# Patient Record
Sex: FEMALE | Race: OTHER | Employment: FULL TIME | URBAN - METROPOLITAN AREA
[De-identification: names, ages, dates, MRNs, and addresses within clinical notes are randomized per-mention and may not be internally consistent; named-entity substitution may affect disease eponyms.]

---

## 2017-05-30 PROCEDURE — 84144 ASSAY OF PROGESTERONE: CPT | Performed by: OBSTETRICS & GYNECOLOGY

## 2017-09-18 PROCEDURE — 84144 ASSAY OF PROGESTERONE: CPT | Performed by: OBSTETRICS & GYNECOLOGY

## 2017-12-23 PROCEDURE — 85306 CLOT INHIBIT PROT S FREE: CPT | Performed by: OBSTETRICS & GYNECOLOGY

## 2017-12-23 PROCEDURE — 85305 CLOT INHIBIT PROT S TOTAL: CPT | Performed by: OBSTETRICS & GYNECOLOGY

## 2017-12-23 PROCEDURE — 84144 ASSAY OF PROGESTERONE: CPT | Performed by: OBSTETRICS & GYNECOLOGY

## 2017-12-23 PROCEDURE — 86376 MICROSOMAL ANTIBODY EACH: CPT | Performed by: OBSTETRICS & GYNECOLOGY

## 2017-12-30 PROCEDURE — 86147 CARDIOLIPIN ANTIBODY EA IG: CPT | Performed by: OBSTETRICS & GYNECOLOGY

## 2017-12-30 PROCEDURE — 85613 RUSSELL VIPER VENOM DILUTED: CPT | Performed by: OBSTETRICS & GYNECOLOGY

## 2017-12-30 PROCEDURE — 85732 THROMBOPLASTIN TIME PARTIAL: CPT | Performed by: OBSTETRICS & GYNECOLOGY

## 2017-12-30 PROCEDURE — 88237 TISSUE CULTURE BONE MARROW: CPT | Performed by: OBSTETRICS & GYNECOLOGY

## 2017-12-30 PROCEDURE — 85307 ASSAY ACTIVATED PROTEIN C: CPT | Performed by: OBSTETRICS & GYNECOLOGY

## 2017-12-30 PROCEDURE — 85705 THROMBOPLASTIN INHIBITION: CPT | Performed by: OBSTETRICS & GYNECOLOGY

## 2017-12-30 PROCEDURE — 85610 PROTHROMBIN TIME: CPT | Performed by: OBSTETRICS & GYNECOLOGY

## 2017-12-30 PROCEDURE — 81240 F2 GENE: CPT | Performed by: OBSTETRICS & GYNECOLOGY

## 2017-12-30 PROCEDURE — 88262 CHROMOSOME ANALYSIS 15-20: CPT | Performed by: OBSTETRICS & GYNECOLOGY

## 2017-12-30 PROCEDURE — 84144 ASSAY OF PROGESTERONE: CPT | Performed by: OBSTETRICS & GYNECOLOGY

## 2017-12-30 PROCEDURE — 85300 ANTITHROMBIN III ACTIVITY: CPT | Performed by: OBSTETRICS & GYNECOLOGY

## 2017-12-30 PROCEDURE — 83090 ASSAY OF HOMOCYSTEINE: CPT | Performed by: OBSTETRICS & GYNECOLOGY

## 2018-04-12 ENCOUNTER — APPOINTMENT (OUTPATIENT)
Dept: LAB | Facility: HOSPITAL | Age: 33
End: 2018-04-12
Attending: INTERNAL MEDICINE
Payer: COMMERCIAL

## 2018-04-12 ENCOUNTER — OFFICE VISIT (OUTPATIENT)
Dept: SURGERY | Facility: CLINIC | Age: 33
End: 2018-04-12

## 2018-04-12 VITALS
RESPIRATION RATE: 16 BRPM | WEIGHT: 214.06 LBS | SYSTOLIC BLOOD PRESSURE: 132 MMHG | BODY MASS INDEX: 42.03 KG/M2 | HEIGHT: 60 IN | DIASTOLIC BLOOD PRESSURE: 96 MMHG | OXYGEN SATURATION: 98 % | HEART RATE: 91 BPM

## 2018-04-12 DIAGNOSIS — R73.03 PRE-DIABETES: ICD-10-CM

## 2018-04-12 DIAGNOSIS — E66.01 MORBID OBESITY WITH BMI OF 40.0-44.9, ADULT (HCC): ICD-10-CM

## 2018-04-12 DIAGNOSIS — E78.00 HYPERCHOLESTEREMIA: ICD-10-CM

## 2018-04-12 DIAGNOSIS — R73.03 PRE-DIABETES: Primary | ICD-10-CM

## 2018-04-12 PROCEDURE — 93010 ELECTROCARDIOGRAM REPORT: CPT | Performed by: INTERNAL MEDICINE

## 2018-04-12 PROCEDURE — 99204 OFFICE O/P NEW MOD 45 MIN: CPT | Performed by: INTERNAL MEDICINE

## 2018-04-12 PROCEDURE — 93005 ELECTROCARDIOGRAM TRACING: CPT

## 2018-04-12 NOTE — PROGRESS NOTES
[facility]ation Note       Date of Consult:  2018    Patient:  Akiko Armendariz  :      1985  MRN:      [id number]    Referring Provider: Dr. Rubén Leiva       Chief Complaint:  Patient presents with:  Consult  Weight (2/3/2016)  10/24/2017: OTHER      Comment: suction d&c for incomplete  with                hemorrhage w/ general anesthesia @ [facility] w/ TIN.     Family History:    Family History   Problem Relation Age of Onset   • Heart Surgery Father      Bypass   • extremities normal, atraumatic, no cyanosis or edema  Pulses: 2+ and symmetric  Skin: Skin color, texture, turgor normal. No rashes or lesions    ASSESSMENT         Encounter Diagnosis(ses):   Pre-diabetes  (primary encounter diagnosis)  Hypercholesteremia

## 2018-05-08 DIAGNOSIS — E66.01 MORBID OBESITY WITH BMI OF 40.0-44.9, ADULT (HCC): ICD-10-CM

## 2018-05-08 DIAGNOSIS — R73.03 PRE-DIABETES: ICD-10-CM

## 2018-05-08 DIAGNOSIS — E78.00 HYPERCHOLESTEREMIA: ICD-10-CM

## 2018-05-08 RX ORDER — LISDEXAMFETAMINE DIMESYLATE CAPSULES 20 MG/1
20 CAPSULE ORAL DAILY
Qty: 30 CAPSULE | Refills: 0 | Status: CANCELLED
Start: 2018-05-08 | End: 2018-06-07

## 2018-05-16 ENCOUNTER — TELEPHONE (OUTPATIENT)
Dept: SURGERY | Facility: CLINIC | Age: 33
End: 2018-05-16

## 2018-05-16 NOTE — TELEPHONE ENCOUNTER
Regarding: RE:medication  ----- Message from Yessica Cheatham sent at 5/16/2018 10:14 AM CDT -----       ----- Message from Lexi Gaitan to Abelino Larry RN sent at 5/9/2018  9:01 AM -----   Ryan said that this prescription can never be refilled d

## 2018-06-22 ENCOUNTER — TELEPHONE (OUTPATIENT)
Dept: SURGERY | Facility: CLINIC | Age: 33
End: 2018-06-22

## 2018-06-22 NOTE — TELEPHONE ENCOUNTER
Patient never picked up last prescription and prescription .       Patient would like a new prescription for Vyvanse

## 2019-02-18 PROCEDURE — 83525 ASSAY OF INSULIN: CPT | Performed by: FAMILY MEDICINE

## 2019-02-18 PROCEDURE — 86376 MICROSOMAL ANTIBODY EACH: CPT | Performed by: FAMILY MEDICINE

## 2019-02-18 PROCEDURE — 86800 THYROGLOBULIN ANTIBODY: CPT | Performed by: FAMILY MEDICINE

## 2019-04-22 PROCEDURE — 88305 TISSUE EXAM BY PATHOLOGIST: CPT | Performed by: RADIOLOGY

## 2020-01-29 PROBLEM — Z34.80 PRENATAL CARE OF MULTIGRAVIDA, ANTEPARTUM: Status: ACTIVE | Noted: 2020-01-29

## 2020-01-29 PROBLEM — O99.210 OBESITY AFFECTING PREGNANCY, ANTEPARTUM: Status: ACTIVE | Noted: 2020-01-29

## 2020-04-08 PROBLEM — O09.899 SINGLE UMBILICAL ARTERY, MATERNAL, ANTEPARTUM: Status: ACTIVE | Noted: 2020-04-08

## 2020-07-09 PROBLEM — E07.9 THYROID DISEASE AFFECTING PREGNANCY: Status: ACTIVE | Noted: 2020-07-09

## 2020-07-09 PROBLEM — O99.280 THYROID DISEASE AFFECTING PREGNANCY: Status: ACTIVE | Noted: 2020-07-09

## 2020-08-13 PROBLEM — O99.280 THYROID DISEASE AFFECTING PREGNANCY: Status: RESOLVED | Noted: 2020-07-09 | Resolved: 2020-08-12

## 2020-08-13 PROBLEM — E07.9 THYROID DISEASE AFFECTING PREGNANCY: Status: RESOLVED | Noted: 2020-07-09 | Resolved: 2020-08-12

## 2020-08-13 PROBLEM — O99.210 OBESITY AFFECTING PREGNANCY, ANTEPARTUM: Status: RESOLVED | Noted: 2020-01-29 | Resolved: 2020-08-12

## 2020-08-13 PROBLEM — Z34.80 PRENATAL CARE OF MULTIGRAVIDA, ANTEPARTUM: Status: RESOLVED | Noted: 2020-01-29 | Resolved: 2020-08-12

## 2020-08-13 PROBLEM — O09.899 SINGLE UMBILICAL ARTERY, MATERNAL, ANTEPARTUM: Status: RESOLVED | Noted: 2020-04-08 | Resolved: 2020-08-12

## 2021-02-24 PROBLEM — E66.01 CLASS 3 SEVERE OBESITY DUE TO EXCESS CALORIES WITHOUT SERIOUS COMORBIDITY WITH BODY MASS INDEX (BMI) OF 45.0 TO 49.9 IN ADULT (HCC): Status: ACTIVE | Noted: 2018-04-12

## 2021-03-11 PROBLEM — E88.81 INSULIN RESISTANCE: Status: ACTIVE | Noted: 2021-03-11

## 2021-03-11 PROBLEM — R79.89 LOW SERUM LEPTIN: Status: ACTIVE | Noted: 2021-03-11

## 2021-03-11 PROBLEM — E88.819 INSULIN RESISTANCE: Status: ACTIVE | Noted: 2021-03-11

## 2021-04-09 DIAGNOSIS — Z23 NEED FOR VACCINATION: ICD-10-CM

## (undated) NOTE — Clinical Note
Thanks for the referral  I am going to refer her to our dietician.   Will also start her on Vyvanse   Thanks  Karmen Case